# Patient Record
Sex: FEMALE | URBAN - METROPOLITAN AREA
[De-identification: names, ages, dates, MRNs, and addresses within clinical notes are randomized per-mention and may not be internally consistent; named-entity substitution may affect disease eponyms.]

---

## 2018-05-09 ENCOUNTER — APPOINTMENT (RX ONLY)
Age: 73
Setting detail: DERMATOLOGY
End: 2018-05-09

## 2018-05-09 DIAGNOSIS — L23.9 ALLERGIC CONTACT DERMATITIS, UNSPECIFIED CAUSE: ICD-10-CM

## 2018-05-09 PROBLEM — E13.9 OTHER SPECIFIED DIABETES MELLITUS WITHOUT COMPLICATIONS: Status: ACTIVE | Noted: 2018-05-09

## 2018-05-09 PROBLEM — L85.3 XEROSIS CUTIS: Status: ACTIVE | Noted: 2018-05-09

## 2018-05-09 PROBLEM — L29.8 OTHER PRURITUS: Status: ACTIVE | Noted: 2018-05-09

## 2018-05-09 PROCEDURE — ? PRESCRIPTION

## 2018-05-09 PROCEDURE — 99202 OFFICE O/P NEW SF 15 MIN: CPT

## 2018-05-09 PROCEDURE — ? DIAGNOSIS COMMENT

## 2018-05-09 PROCEDURE — ? COUNSELING

## 2018-05-09 NOTE — HPI: RASH
How Severe Is Your Rash?: mild
Is This A New Presentation, Or A Follow-Up?: Rash
Additional History: She feels like the sun makes the itching worse.  She also reports itching on the trunk since this rash formed.

## 2018-05-09 NOTE — PROCEDURE: DIAGNOSIS COMMENT
Comment: She has erythematous to hyperpigmented (PIH) patches and plaques on the forehead, temporal scalp, around eyebrows. There is an erythematous plaque on the left lateral breast.\\n- triamcinolone ointment BID x 2 weeks, then as needed\\n- Elidel can be used when eruption is better, if she needs to use something more long-term\\n- discussed sun protection with hats and sun screens (with zinc oxide and titanium dioxide)\\n- dry skin care discussed
Detail Level: Simple

## 2018-05-10 RX ORDER — TRIAMCINOLONE ACETONIDE 0.25 MG/G
OINTMENT TOPICAL
Qty: 1 | Refills: 0 | Status: ERX | COMMUNITY
Start: 2018-05-10

## 2018-05-10 RX ADMIN — TRIAMCINOLONE ACETONIDE: 0.25 OINTMENT TOPICAL at 00:04
